# Patient Record
Sex: FEMALE | Race: OTHER | Employment: OTHER | RURAL
[De-identification: names, ages, dates, MRNs, and addresses within clinical notes are randomized per-mention and may not be internally consistent; named-entity substitution may affect disease eponyms.]

---

## 2022-09-05 ENCOUNTER — HOSPITAL ENCOUNTER (EMERGENCY)
Age: 67
Discharge: HOME | End: 2022-09-05
Attending: FAMILY MEDICINE
Payer: MEDICARE

## 2022-09-05 ENCOUNTER — HOSPITAL ENCOUNTER (EMERGENCY)
Age: 67
Discharge: HOME | End: 2022-09-05
Attending: EMERGENCY MEDICINE
Payer: MEDICARE

## 2022-09-05 VITALS
RESPIRATION RATE: 18 BRPM | HEART RATE: 55 BPM | TEMPERATURE: 98.2 F | BODY MASS INDEX: 27.66 KG/M2 | DIASTOLIC BLOOD PRESSURE: 72 MMHG | OXYGEN SATURATION: 99 % | WEIGHT: 162 LBS | SYSTOLIC BLOOD PRESSURE: 137 MMHG | HEIGHT: 64 IN

## 2022-09-05 VITALS
DIASTOLIC BLOOD PRESSURE: 80 MMHG | SYSTOLIC BLOOD PRESSURE: 120 MMHG | BODY MASS INDEX: 28.68 KG/M2 | OXYGEN SATURATION: 99 % | HEIGHT: 64 IN | TEMPERATURE: 97.8 F | WEIGHT: 168 LBS | RESPIRATION RATE: 17 BRPM | HEART RATE: 72 BPM

## 2022-09-05 DIAGNOSIS — H53.9 VISION ABNORMALITIES: Primary | ICD-10-CM

## 2022-09-05 DIAGNOSIS — H43.11 VITREOUS HEMORRHAGE OF RIGHT EYE (HCC): Primary | ICD-10-CM

## 2022-09-05 PROCEDURE — 99282 EMERGENCY DEPT VISIT SF MDM: CPT

## 2022-09-05 RX ORDER — HYDROCHLOROTHIAZIDE 12.5 MG/1
12.5 TABLET ORAL DAILY
COMMUNITY
Start: 2022-08-20

## 2022-09-05 RX ORDER — LOSARTAN POTASSIUM 50 MG/1
50 TABLET ORAL DAILY
COMMUNITY
Start: 2022-08-20

## 2022-09-05 RX ORDER — METOPROLOL SUCCINATE 50 MG/1
50 TABLET, EXTENDED RELEASE ORAL DAILY
COMMUNITY
Start: 2022-08-31

## 2022-09-05 RX ORDER — AA/PROT/LYSINE/METHIO/VIT C/B6 50-12.5 MG
TABLET ORAL
COMMUNITY

## 2022-09-05 RX ORDER — IPRATROPIUM BROMIDE 42 UG/1
SPRAY, METERED NASAL
COMMUNITY
Start: 2022-06-20

## 2022-09-05 RX ORDER — ESCITALOPRAM OXALATE 10 MG/1
10 TABLET ORAL DAILY
COMMUNITY
Start: 2022-08-31

## 2022-09-05 RX ORDER — ATORVASTATIN CALCIUM 10 MG/1
10 TABLET, FILM COATED ORAL DAILY
COMMUNITY

## 2022-09-05 NOTE — ED PROVIDER NOTES
EMERGENCY DEPARTMENT HISTORY AND PHYSICAL EXAM          Date: 9/5/2022  Patient Name: Preethi Henderson    History of Presenting Illness     Chief Complaint   Patient presents with    Eye Problem       History Provided By: Patient    HPI: Preethi Henderson is a 75 y.o. female, pmhx listed below, who presents to the ED c/o cloudy vision. Patient reports she has a history of 3 days of cloudy vision in right eye associated with a floater. No discharge. No eye redness noted. No trauma. No history of similar symptoms. Has not noted any associated symptoms. Went to the pharmacy to buy drops but was unsuccessful, came to ER for further evaluation. PCP: Cinthya Lambert PA-C    There are no other complaints, changes, or physical findings at this time. Past History       Past Medical History:  No past medical history on file. Past Surgical History:  No past surgical history on file. Family History:  No family history on file. Social History: Allergies:  Not on File      Review of Systems   Review of Systems   Constitutional:  Negative for chills and fever. HENT:  Negative for congestion. Eyes:  Positive for visual disturbance. Negative for pain. Respiratory:  Negative for shortness of breath. Cardiovascular:  Negative for chest pain. Gastrointestinal:  Negative for abdominal pain. Genitourinary:  Negative for flank pain. Musculoskeletal:  Negative for back pain. Neurological:  Negative for headaches. Psychiatric/Behavioral:  Negative for agitation. Physical Exam     Vital Signs-Reviewed the patient's vital signs. Patient Vitals for the past 12 hrs:   Temp Pulse Resp BP SpO2   09/05/22 1059 98.2 °F (36.8 °C) (!) 55 18 137/72 99 %       Physical Exam  Constitutional:       Appearance: Normal appearance. HENT:      Head: Normocephalic and atraumatic. Mouth/Throat:      Mouth: Mucous membranes are moist.   Eyes:      General: No scleral icterus. Right eye: No discharge. Left eye: No discharge. Extraocular Movements: Extraocular movements intact. Conjunctiva/sclera: Conjunctivae normal.      Pupils: Pupils are equal, round, and reactive to light. Cardiovascular:      Rate and Rhythm: Normal rate. Pulmonary:      Effort: Pulmonary effort is normal.   Skin:     General: Skin is warm and dry. Neurological:      Mental Status: She is alert and oriented to person, place, and time. Psychiatric:         Mood and Affect: Mood normal.       Diagnostic Study Results     Labs -   No results found for this or any previous visit (from the past 12 hour(s)). Radiologic Studies -   No orders to display     CT Results  (Last 48 hours)      None          CXR Results  (Last 48 hours)      None                Medical Decision Making   I am the first provider for this patient. I reviewed the vital signs, available nursing notes, past medical history, past surgical history, family history and social history. Records Reviewed: Nursing Notes and Old Medical Records    Provider Notes (Medical Decision Making):   MDM: 65-year-old female with clouded vision in right eye. Visual acuity 20/40 in both eyes, 20/50 in right and left eye separately. Pupil and extraocular movements are all within normal limits. No eye pain to suggest iritis, acute angle glaucoma. Bedside ultrasound reveals vitreous hemorrhage with intact retina, no signs of retinal detachment. We will have patient follow-up with ophthalmology this week for more in-depth evaluation. Patient is visiting from [place], provided number for ophthalmologist locally. Will follow up as instructed. All questions have been answered, pt voiced understanding and agreement with plan. Specific return precautions provided as well as instructions to return to the ED should sx worsen at any time. Vital signs stable for discharge. Diagnosis     Clinical Impression:   1.  Vitreous hemorrhage of right eye ([address]            Disposition:  Discharged    There are no discharge medications for this patient. Please note, this dictation was completed with [employer], the computer voice recognition software. Quite often unanticipated grammatical, syntax, homophones, and other interpretive errors are inadvertently transcribed by the computer software. Please disregard these errors. Please excuse any errors that have escaped final proof reading.

## 2022-09-05 NOTE — ED TRIAGE NOTES
Pt reports that her vision is cloudy out of her right eye and has been ongoing for 4 days, No itching, drainage or pain.

## 2022-09-06 NOTE — ED TRIAGE NOTES
Pt c/o seeing flashes of light in her right eye. Pt diagnosed with Vitreous Hemorrhage of right eye. Denies eye pain.

## 2025-07-23 NOTE — ED PROVIDER NOTES
EMERGENCY DEPARTMENT HISTORY AND PHYSICAL EXAM          Date: 9/5/2022  Patient Name: Mary Hamilton    History of Presenting Illness     Chief Complaint   Patient presents with    Eye Pain       History Provided By: Patient    HPI: Mary Hamilton is a 75 y.o. female seen earlier, who presents to the ED c/o a 3 day h/o cloudy vision. She was see earlier this am, around 12 hours ago, and dx'ed as having a vitreous hemorrhage. She was told to return to the ED if she saw any flashes of light, which she did this evening, for about 20-30 minutes, then stopped spontaneously. She has had no numbness or weakness, and she is able to control her eyes completely. Has never had anything like this before. PCP: Margo Flores PA-C    Allergies: NKDA  Social Hx: No tobacco, vaping, EtOH, Illicit Drugs; Lives     There are no other complaints, changes, or physical findings at this time. Past History     Past Medical History:  Past Medical History:   Diagnosis Date    Hypertension        Past Surgical History:  History reviewed. No pertinent surgical history. Family History:  History reviewed. No pertinent family history. Social History:  Social History     Tobacco Use    Smoking status: Every Day     Packs/day: 0.25     Years: 30.00     Pack years: 7.50     Types: Cigarettes    Smokeless tobacco: Never   Vaping Use    Vaping Use: Never used   Substance Use Topics    Alcohol use: Never       Allergies:  No Known Allergies      Review of Systems   Review of Systems   Eyes:  Positive for discharge and visual disturbance. Negative for pain and redness. Gastrointestinal:  Negative for nausea and vomiting. Musculoskeletal:  Negative for back pain and myalgias. Skin:  Negative for rash and wound. Physical Exam  Constitutional:       Appearance: Normal appearance. She is normal weight. HENT:      Head: Normocephalic.       Right Ear: Tympanic membrane and ear canal normal.      Left Ear: Tympanic Followed by vascular surgeon; she is on ASA, plavix, zetia, and statin  She has non healing wound right ankle from PAD  Dr Pérez aware and OK to proceed with GRETCHEN on Friday   membrane normal.   Neurological:      General: No focal deficit present. Mental Status: She is alert and oriented to person, place, and time. Diagnostic Study Results       Labs -   No results found for this or any previous visit (from the past 12 hour(s)). Radiologic Studies -   No orders to display     CT Results  (Last 48 hours)      None                  Medical Decision Making   I am the first provider for this patient. I reviewed the vital signs, available nursing notes, past medical history, past surgical history, family history and social history. Vital Signs-Reviewed the patient's vital signs. Patient Vitals for the past 12 hrs:   Temp Pulse Resp BP SpO2   09/05/22 2220 -- -- -- -- 99 %   09/05/22 2214 97.8 °F (36.6 °C) 72 17 120/80 99 %       Pulse Oximetry Analysis - 99% on RA      Records Reviewed: Nursing Notes and Old Medical Records    Provider Notes (Medical Decision Making):   MDM:  Older WF with vision abnormalities. Could be floaters vs retinal hemorrhage vs conjunctivitis. ED Course:   Initial assessment performed. The patients presenting problems have been discussed, and they are in agreement with the care plan formulated and outlined with them. I have encouraged them to ask questions as they arise throughout their visit. PROGRESS NOTE:  Eye examined, pt interviewed. Given a note for work. Suggested that she follow up with ophtho tomorrow. She was concerned that she would not be able to get in to an ophthamologist's office, so she was given the names and numbers of three physicians with whom she could follow up. Discharge note:  Pt re-evaluated and noted to be feeling better , ready for discharge. Updated pt  on all final lab  findings. Will follow up as instructed . All questions have been answered, pt voiced understanding and agreement with plan. Specific return precautions provided as well as instructions to return to the ED should sx worsen at any time. Vital signs stable for discharge. Critical Care Time: 0      Diagnosis     Clinical Impression:   1. Vision abnormalities        PLAN:  1. Discharge Medication List as of 9/5/2022 11:06 PM        2.    Follow-up Information       Follow up With Specialties Details Why Contact Info    Osmar Gould MD Ophthalmology In 1 day  [address]  [phone]      Yomi Lawler MD Ophthalmology   [facility] [address] [facility]      Mildred Du MD Ophthalmology In 1 day  [address] [facility] [id number] [phone]            Return to ED if worse     Disposition:  Home